# Patient Record
Sex: MALE | Race: WHITE | NOT HISPANIC OR LATINO | Employment: UNEMPLOYED | URBAN - NONMETROPOLITAN AREA
[De-identification: names, ages, dates, MRNs, and addresses within clinical notes are randomized per-mention and may not be internally consistent; named-entity substitution may affect disease eponyms.]

---

## 2025-02-07 ENCOUNTER — APPOINTMENT (OUTPATIENT)
Dept: RADIOLOGY | Facility: HOSPITAL | Age: 60
End: 2025-02-07
Payer: MEDICAID

## 2025-02-07 ENCOUNTER — HOSPITAL ENCOUNTER (EMERGENCY)
Facility: HOSPITAL | Age: 60
Discharge: HOME | End: 2025-02-07
Attending: EMERGENCY MEDICINE
Payer: MEDICAID

## 2025-02-07 VITALS
SYSTOLIC BLOOD PRESSURE: 114 MMHG | RESPIRATION RATE: 16 BRPM | DIASTOLIC BLOOD PRESSURE: 73 MMHG | TEMPERATURE: 98.4 F | BODY MASS INDEX: 44.96 KG/M2 | WEIGHT: 304.24 LBS | OXYGEN SATURATION: 94 % | HEART RATE: 72 BPM

## 2025-02-07 DIAGNOSIS — M25.572 LEFT ANKLE PAIN, UNSPECIFIED CHRONICITY: Primary | ICD-10-CM

## 2025-02-07 PROCEDURE — 73610 X-RAY EXAM OF ANKLE: CPT | Mod: LEFT SIDE | Performed by: RADIOLOGY

## 2025-02-07 PROCEDURE — 99283 EMERGENCY DEPT VISIT LOW MDM: CPT | Performed by: EMERGENCY MEDICINE

## 2025-02-07 PROCEDURE — 73610 X-RAY EXAM OF ANKLE: CPT | Mod: LT

## 2025-02-07 ASSESSMENT — COLUMBIA-SUICIDE SEVERITY RATING SCALE - C-SSRS
6. HAVE YOU EVER DONE ANYTHING, STARTED TO DO ANYTHING, OR PREPARED TO DO ANYTHING TO END YOUR LIFE?: NO
1. IN THE PAST MONTH, HAVE YOU WISHED YOU WERE DEAD OR WISHED YOU COULD GO TO SLEEP AND NOT WAKE UP?: NO
2. HAVE YOU ACTUALLY HAD ANY THOUGHTS OF KILLING YOURSELF?: NO

## 2025-02-07 ASSESSMENT — PAIN - FUNCTIONAL ASSESSMENT: PAIN_FUNCTIONAL_ASSESSMENT: 0-10

## 2025-02-07 ASSESSMENT — PAIN SCALES - GENERAL: PAINLEVEL_OUTOF10: 8

## 2025-02-08 NOTE — ED PROVIDER NOTES
HPI   Chief Complaint   Patient presents with    Ankle Pain     Pt states he had a fall approx 2 months ago, having worsening left ankle pain and swelling.        59-year-old male presents with persistent pain lateral aspect of left ankle.  Patient injured it approximately 2 months ago when he fell down some steps.  Patient has continued to walk on it and states he has had persistent pain.  Patient does have some swelling lateral malleolus.  Patient denies any recent reinjury.  I did go over the results with the patient.  Patient will be put in a splint and instructed to follow-up with orthopedic provider.  Minimal weightbearing if possible.      History provided by:  Patient          Patient History   No past medical history on file.  No past surgical history on file.  No family history on file.  Social History     Tobacco Use    Smoking status: Not on file    Smokeless tobacco: Not on file   Substance Use Topics    Alcohol use: Not on file    Drug use: Not on file       Physical Exam   ED Triage Vitals [02/07/25 2219]   Temperature Heart Rate Respirations BP   36.9 °C (98.4 °F) 78 16 (!) 176/92      Pulse Ox Temp src Heart Rate Source Patient Position   95 % -- -- --      BP Location FiO2 (%)     -- --       Physical Exam  Constitutional:       Appearance: Normal appearance.   Musculoskeletal:      Comments: Swelling lateral aspect of left ankle.  Slight pain with flexion extension.  Neurovascular is intact.   Neurological:      Mental Status: He is alert.       XR ankle left 3+ views   Final Result   No acute fracture or dislocation of the left ankle.        Soft tissue edema of the ankle.             MACRO:   None        Signed by: Soto Ralph 2/7/2025 11:27 PM   Dictation workstation:   [id number]         ED Course & MDM   Diagnoses as of 02/07/25 2329   Left ankle pain, unspecified chronicity                 No data recorded     Brandon Coma Scale Score: 15 (02/07/25 2220 : Carmen Nguyễn RN)                            Medical Decision Making  1 splint 2 Motrin or Tylenol for pain 3 minimal weightbearing for the next several days 4 follow-up with orthopedics in the next several days if symptoms worsen return to ED.        Procedure  Procedures     Jayy Goldstein,   02/07/25 [id number]       Jayy Goldstein,   02/07/25 7630